# Patient Record
Sex: FEMALE | Race: WHITE
[De-identification: names, ages, dates, MRNs, and addresses within clinical notes are randomized per-mention and may not be internally consistent; named-entity substitution may affect disease eponyms.]

---

## 2021-04-01 ENCOUNTER — HOSPITAL ENCOUNTER (INPATIENT)
Dept: HOSPITAL 54 - ER | Age: 26
LOS: 1 days | Discharge: HOME | DRG: 101 | End: 2021-04-02
Attending: INTERNAL MEDICINE | Admitting: INTERNAL MEDICINE
Payer: COMMERCIAL

## 2021-04-01 VITALS — DIASTOLIC BLOOD PRESSURE: 91 MMHG | SYSTOLIC BLOOD PRESSURE: 130 MMHG

## 2021-04-01 VITALS — SYSTOLIC BLOOD PRESSURE: 126 MMHG | DIASTOLIC BLOOD PRESSURE: 90 MMHG

## 2021-04-01 VITALS — SYSTOLIC BLOOD PRESSURE: 118 MMHG | DIASTOLIC BLOOD PRESSURE: 77 MMHG

## 2021-04-01 VITALS — DIASTOLIC BLOOD PRESSURE: 84 MMHG | SYSTOLIC BLOOD PRESSURE: 122 MMHG

## 2021-04-01 VITALS — DIASTOLIC BLOOD PRESSURE: 66 MMHG | SYSTOLIC BLOOD PRESSURE: 97 MMHG

## 2021-04-01 VITALS — HEIGHT: 64 IN | WEIGHT: 125.38 LBS | BODY MASS INDEX: 21.4 KG/M2

## 2021-04-01 VITALS — DIASTOLIC BLOOD PRESSURE: 90 MMHG | SYSTOLIC BLOOD PRESSURE: 126 MMHG

## 2021-04-01 DIAGNOSIS — Z91.048: ICD-10-CM

## 2021-04-01 DIAGNOSIS — G40.909: Primary | ICD-10-CM

## 2021-04-01 DIAGNOSIS — E87.2: ICD-10-CM

## 2021-04-01 DIAGNOSIS — Z79.899: ICD-10-CM

## 2021-04-01 DIAGNOSIS — Z91.018: ICD-10-CM

## 2021-04-01 DIAGNOSIS — M50.20: ICD-10-CM

## 2021-04-01 DIAGNOSIS — F15.90: ICD-10-CM

## 2021-04-01 DIAGNOSIS — F41.9: ICD-10-CM

## 2021-04-01 LAB
ALBUMIN SERPL BCP-MCNC: 3.5 G/DL (ref 3.4–5)
ALP SERPL-CCNC: 47 U/L (ref 46–116)
ALT SERPL W P-5'-P-CCNC: 21 U/L (ref 12–78)
AST SERPL W P-5'-P-CCNC: 18 U/L (ref 15–37)
BASOPHILS # BLD AUTO: 0.1 /CMM (ref 0–0.2)
BASOPHILS NFR BLD AUTO: 1.5 % (ref 0–2)
BILIRUB DIRECT SERPL-MCNC: 0.1 MG/DL (ref 0–0.2)
BILIRUB SERPL-MCNC: 0.4 MG/DL (ref 0.2–1)
BUN SERPL-MCNC: 7 MG/DL (ref 7–18)
CALCIUM SERPL-MCNC: 8.3 MG/DL (ref 8.5–10.1)
CHLORIDE SERPL-SCNC: 101 MMOL/L (ref 98–107)
CO2 SERPL-SCNC: 22 MMOL/L (ref 21–32)
CREAT SERPL-MCNC: 0.9 MG/DL (ref 0.6–1.3)
EOSINOPHIL NFR BLD AUTO: 1.6 % (ref 0–6)
ETHANOL SERPL-MCNC: < 3 MG/DL (ref 0–0)
GLUCOSE SERPL-MCNC: 125 MG/DL (ref 74–106)
HCT VFR BLD AUTO: 39 % (ref 33–45)
HGB BLD-MCNC: 13.2 G/DL (ref 11.5–14.8)
LYMPHOCYTES NFR BLD AUTO: 1.2 /CMM (ref 0.8–4.8)
LYMPHOCYTES NFR BLD AUTO: 21.2 % (ref 20–44)
MCHC RBC AUTO-ENTMCNC: 34 G/DL (ref 31–36)
MCV RBC AUTO: 90 FL (ref 82–100)
MONOCYTES NFR BLD AUTO: 0.4 /CMM (ref 0.1–1.3)
MONOCYTES NFR BLD AUTO: 6.8 % (ref 2–12)
NEUTROPHILS # BLD AUTO: 3.9 /CMM (ref 1.8–8.9)
NEUTROPHILS NFR BLD AUTO: 68.9 % (ref 43–81)
PLATELET # BLD AUTO: 236 /CMM (ref 150–450)
POTASSIUM SERPL-SCNC: 3.6 MMOL/L (ref 3.5–5.1)
PROT SERPL-MCNC: 7.4 G/DL (ref 6.4–8.2)
RBC # BLD AUTO: 4.3 MIL/UL (ref 4–5.2)
SODIUM SERPL-SCNC: 137 MMOL/L (ref 136–145)
WBC NRBC COR # BLD AUTO: 5.7 K/UL (ref 4.3–11)

## 2021-04-01 PROCEDURE — C9803 HOPD COVID-19 SPEC COLLECT: HCPCS

## 2021-04-01 PROCEDURE — G0480 DRUG TEST DEF 1-7 CLASSES: HCPCS

## 2021-04-01 PROCEDURE — G0378 HOSPITAL OBSERVATION PER HR: HCPCS

## 2021-04-01 RX ADMIN — LORAZEPAM PRN MG: 2 INJECTION INTRAMUSCULAR; INTRAVENOUS at 18:47

## 2021-04-01 RX ADMIN — SODIUM CHLORIDE PRN MLS/HR: 4.5 INJECTION, SOLUTION INTRAVENOUS at 17:19

## 2021-04-01 NOTE — NUR
pt as talking to md and verbalized that she have a headache. verbal order 
received from ER MD to give pt toradol 15mg iv x 1. noted and carried out.

## 2021-04-01 NOTE — NUR
TELE RN ADMISSION NOTE:



PT REMAINS IN BED, RESTING. PT IS A/O X 4, VERBAL, ENGLISH SPEAKING AND ABLE TO MAKE NEEDS 
KNOWN. PT HAS NO C/O PAIN AND NO S/SX OF ACUTE DISTRESS AT THIS TIME. PT IS ON ROOM AIR, 
EVEN RISE AND FALL OF CHEST, WITH NO S/SX OF RESPIRATORY DISTRESS OR SOB NOTED. PT'S TELE 
MONITOR SHOWS NSR AT 90 BPM, PT IS IN NO CARDIAC DISTRESS AT THIS TIME. PT HAS AN IV ACCESS 
ON LEFT AC G#18, PATENT, INTACT AND FLUSHING WELL, RUNNING 1/2 NS AT 75 ML/HR, WITH NO S/S 
OF INFECTION, INFILTRATION OR IRRITATION. PT IS AMBULATORY WITH STEADY GAIT. SAFETY MEASURES 
MAINTAINED: BED IN LOWEST POSITION AND LOCKED WITH BOTH UPPER SIDE RAILS UP X 2. CALL LIGHT 
PLACED WITHIN REACH. WILL ENDORSE TO NIGHT SHIFT NURSE.

-------------------------------------------------------------------------------

Addendum: 04/01/21 at 1812 by TYLER ZAPIEN RN

-------------------------------------------------------------------------------

TELE RN CLOSING NOTE*

## 2021-04-01 NOTE — NUR
TELE RN OPENING NOTE



PATIENT IN BED, AWAKE, A/O X 4. PATIENT CALM AT HIS TIME, NO REPORTS OF PAIN OR DISCOMFORT. 
BREATHING EVEN AND UNLABORED. PATIENT CURRENTLY SINUS TACH WITH HR  BPM. TOLERATING 
ROOM AIR WITH 97% O2 SATURATION. SEIZURE PRECAUTIONS IN PLACE AS WELL AS SAFETY MEASURES. IV 
FLUIDS INFUSING WELL. IV SITE PATENT AND INTACT. CALL LIGHT WITHIN REACH, ENCOURAGED PATIENT 
TO USE CALL LIGHT IF IN NEED OF ANYTHING. WILL CONTINUE TO MONITOR PATIENT CLOSELY 
THROUGHOUT THE SHIFT.

## 2021-04-01 NOTE — NUR
THE PATIENT IS [id number] FROM WORK FOR WITNESS SEIZURE, BACK OF HEAD HEMATOMA S/P 
FALL. THE PATIENT IS ALERT AND ORIENTED X3. THE PATIENT C/O HEADACHE 8/10. 
DENIES SOB. RESPIRATION REGULAR AND UNLABORED. THE PATIENT IS IN ER BED #12. 
SIDE RAILS PADED. PROVIDED WITH A BLANKET. WILL CONTINUE TO MONITOR.

## 2021-04-01 NOTE — NUR
TELE RN NOTE: PAIN



PT C/O ACHING HEADACHE PAIN 3/10. TYLENOL 650MG PO Q6H PRN ADMINISTERED AT 16:12 AS ORDERED 
PER PT REQUEST. WILL CONTINUE TO MONITOR.

## 2021-04-01 NOTE — NUR
TEL RN ADMISSION NOTE:



PT ARRIVED IN UNIT AT 14:00 VIA GURNEY ACCOMPANIED BY ER RN. PT IS A/O X 4, VERBAL, ENGLISH 
SPEAKING AND ABLE TO MAKE NEEDS KNOWN. PT HAS NO C/O PAIN AND NO S/SX OF ACUTE DISTRESS AT 
THIS TIME. VSS. PT IS ON ROOM AIR, EVEN RISE AND FALL OF CHEST, WITH NO S/SX OF RESPIRATORY 
DISTRESS OR SOB NOTED. PT HAS AN IV ACCESS ON LEFT AC G#18, PATENT, INTACT AND FLUSHING 
WELL. PT HAS NO SKIN ISSUES. PT IS AMBULATORY WITH STEADY GAIT. PATIENT ORIENTED TO UNIT AND 
ROOM WITH SUCCESSFUL RETURN DEMONSTRATION AND VERBALIZATION OR UNDERSTANDING. SAFETY 
MEASURES IN PLACE: BED IN LOWEST POSITION AND LOCKED WITH BOTH UPPER SIDE RAILS UP X 2. CALL 
LIGHT PLACED WITHIN REACH. WILL CONTINUE TO MONITOR.

-------------------------------------------------------------------------------

Addendum: 04/01/21 at 1619 by TYLER ZAPIEN RN

-------------------------------------------------------------------------------

PT'S TELE MONITOR SHOWS NSR AT 91 BPM, PT IS IN NO CARDIAC DISTRESS AT THIS TIME.

## 2021-04-02 VITALS — SYSTOLIC BLOOD PRESSURE: 119 MMHG | DIASTOLIC BLOOD PRESSURE: 64 MMHG

## 2021-04-02 LAB
ALBUMIN SERPL BCP-MCNC: 3 G/DL (ref 3.4–5)
ALP SERPL-CCNC: 41 U/L (ref 46–116)
ALT SERPL W P-5'-P-CCNC: 18 U/L (ref 12–78)
AST SERPL W P-5'-P-CCNC: 15 U/L (ref 15–37)
BASOPHILS # BLD AUTO: 0 /CMM (ref 0–0.2)
BASOPHILS NFR BLD AUTO: 0.6 % (ref 0–2)
BILIRUB SERPL-MCNC: 0.4 MG/DL (ref 0.2–1)
BUN SERPL-MCNC: 5 MG/DL (ref 7–18)
CALCIUM SERPL-MCNC: 8.5 MG/DL (ref 8.5–10.1)
CHLORIDE SERPL-SCNC: 106 MMOL/L (ref 98–107)
CHOLEST SERPL-MCNC: 146 MG/DL (ref ?–200)
CO2 SERPL-SCNC: 22 MMOL/L (ref 21–32)
CREAT SERPL-MCNC: 0.7 MG/DL (ref 0.6–1.3)
EOSINOPHIL NFR BLD AUTO: 4.4 % (ref 0–6)
GLUCOSE SERPL-MCNC: 88 MG/DL (ref 74–106)
HCT VFR BLD AUTO: 38 % (ref 33–45)
HDLC SERPL-MCNC: 59 MG/DL (ref 40–60)
HGB BLD-MCNC: 12.5 G/DL (ref 11.5–14.8)
LDLC SERPL DIRECT ASSAY-MCNC: 68 MG/DL (ref 0–99)
LYMPHOCYTES NFR BLD AUTO: 2.1 /CMM (ref 0.8–4.8)
LYMPHOCYTES NFR BLD AUTO: 38.7 % (ref 20–44)
MAGNESIUM SERPL-MCNC: 2.2 MG/DL (ref 1.8–2.4)
MCHC RBC AUTO-ENTMCNC: 33 G/DL (ref 31–36)
MCV RBC AUTO: 90 FL (ref 82–100)
MONOCYTES NFR BLD AUTO: 0.4 /CMM (ref 0.1–1.3)
MONOCYTES NFR BLD AUTO: 8 % (ref 2–12)
NEUTROPHILS # BLD AUTO: 2.7 /CMM (ref 1.8–8.9)
NEUTROPHILS NFR BLD AUTO: 48.3 % (ref 43–81)
PHOSPHATE SERPL-MCNC: 3.7 MG/DL (ref 2.5–4.9)
PLATELET # BLD AUTO: 234 /CMM (ref 150–450)
POTASSIUM SERPL-SCNC: 3.5 MMOL/L (ref 3.5–5.1)
PROT SERPL-MCNC: 6.4 G/DL (ref 6.4–8.2)
RBC # BLD AUTO: 4.18 MIL/UL (ref 4–5.2)
SODIUM SERPL-SCNC: 138 MMOL/L (ref 136–145)
TRIGL SERPL-MCNC: 95 MG/DL (ref 30–150)
WBC NRBC COR # BLD AUTO: 5.5 K/UL (ref 4.3–11)

## 2021-04-02 RX ADMIN — LORAZEPAM PRN MG: 2 INJECTION INTRAMUSCULAR; INTRAVENOUS at 06:33

## 2021-04-02 RX ADMIN — LORAZEPAM PRN MG: 2 INJECTION INTRAMUSCULAR; INTRAVENOUS at 11:55

## 2021-04-02 RX ADMIN — SODIUM CHLORIDE PRN MLS/HR: 4.5 INJECTION, SOLUTION INTRAVENOUS at 06:33

## 2021-04-02 RX ADMIN — LORAZEPAM PRN MG: 2 INJECTION INTRAMUSCULAR; INTRAVENOUS at 00:42

## 2021-04-02 NOTE — NUR
RN NOTES

VERIFY THE ORDERED OF KEPPRA 500MG IV FROM DR KIMBROUGH, GOT AN ORDER TO CONTINUE IT, ORDER 
NOTED AND CARRIED OUT

## 2021-04-02 NOTE — NUR
RN DISCHARGE NOTES



RECEIVED PATIENT DISCHARGE ORDER FROM MD. NEURO CLEARANCE ALSO RECEIVED. REMOVED IV GAVE 
PATIENT DISCHARGE INSTRUCTIONS AND COPIES. PATIENT VERBALIZED UNDERSTANDING. PATIENT SIGNED 
DISCHARGE PAPERWORK AND BELONGINGS. ALL BELONGINGS ACCOUNTED FOR. PATIENT INSISTED TO LEAVE 
BEFORE WRITTEN PRESCRIPTION, HOWEVER DOCTOR WAS ABLE TO GIVE PRESCRIPTION FOR KEPPRA BEFORE 
SHE LEFT. WRITTEN PRESCRIPTION GIVEN TO PATIENT. PATIENT IS AMBULATORY AND LEFT THE 
HOSPITAL, PATIENT DID NOT WANT ANYONE TO ESCORT HER OUT OF THE HOSPITAL.

## 2021-04-02 NOTE — NUR
RN NOTES



PATIENT WAS SEEN BY DR. SUH FOR NEURO CONSULT W/ ORDER FOR MRI BRAIN W/O CONTRAST; 
CONSENT FORM SIGNED BY PATIENT AND QUESTIONNAIRE COMPLETED.

## 2021-04-02 NOTE — NUR
TELE RN OPENING NOTE



PATIENT IN BED, AWAKE, A/O X 4. PATIENT CALM AT HIS TIME, NO REPORTS OF PAIN OR DISCOMFORT. 
BREATHING EVEN AND UNLABORED. PATIENT SINUS RHYTHM, BUT HISTORY OF SINUS TACHY 107. 
TOLERATING ROOM AIR WITH 98% O2 SATURATION. SEIZURE PRECAUTIONS IN PLACE AS WELL AS SAFETY 
MEASURES. IV FLUIDS INFUSING  1/2 NS @ 75 ML/HR. IV SITE PATENT AND INTACT. R PARIETAL AREA 
BUMP DUE TO FALL FROM SEIZURE BEFORE ADMISSION.  ALL NEEDS MET AND ATTENDED. CALL LIGHT 
WITHIN REACH, ENCOURAGED PATIENT TO USE CALL LIGHT IF IN NEED OF ANYTHING. WILL CONTINUE TO 
MONITOR THROUGHOUT SHIFT.

## 2022-08-09 ENCOUNTER — HOSPITAL ENCOUNTER (EMERGENCY)
Dept: HOSPITAL 54 - ER | Age: 27
Discharge: HOME | End: 2022-08-09
Payer: COMMERCIAL

## 2022-08-09 VITALS — BODY MASS INDEX: 20.49 KG/M2 | HEIGHT: 64 IN | WEIGHT: 120 LBS

## 2022-08-09 VITALS — SYSTOLIC BLOOD PRESSURE: 126 MMHG | DIASTOLIC BLOOD PRESSURE: 82 MMHG

## 2022-08-09 DIAGNOSIS — T78.1XXA: Primary | ICD-10-CM
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
## 2022-08-09 DIAGNOSIS — Z91.018: ICD-10-CM

## 2022-08-09 DIAGNOSIS — Z98.890: ICD-10-CM

## 2022-08-09 DIAGNOSIS — G40.909: ICD-10-CM

## 2022-08-09 DIAGNOSIS — Z79.899: ICD-10-CM

## 2022-08-09 PROCEDURE — 99283 EMERGENCY DEPT VISIT LOW MDM: CPT

## 2022-08-09 PROCEDURE — 96374 THER/PROPH/DIAG INJ IV PUSH: CPT

## 2022-08-09 NOTE — NUR
ALLERGIC REACTION AFTER EATING A PROTEIN BAR TOOK 50 MG OF BENADRYL, AND GIVEN 
HERSELF AN IM IPI BEFORE THE TRANSPORT. PT OXYGEN SATURATION 100%. FACIAL 
SWEALLING NOTED, PT STATED THAT SHE DOESNT FEEL SHORT OF BREATH. DR ELIZONDO 
AT BEDSIDE.